# Patient Record
Sex: MALE | HISPANIC OR LATINO | Employment: UNEMPLOYED | URBAN - METROPOLITAN AREA
[De-identification: names, ages, dates, MRNs, and addresses within clinical notes are randomized per-mention and may not be internally consistent; named-entity substitution may affect disease eponyms.]

---

## 2017-05-19 ENCOUNTER — OFFICE VISIT (OUTPATIENT)
Dept: CARDIOLOGY | Facility: MEDICAL CENTER | Age: 82
End: 2017-05-19
Payer: COMMERCIAL

## 2017-05-19 VITALS
SYSTOLIC BLOOD PRESSURE: 140 MMHG | WEIGHT: 144 LBS | HEART RATE: 76 BPM | DIASTOLIC BLOOD PRESSURE: 66 MMHG | OXYGEN SATURATION: 96 %

## 2017-05-19 DIAGNOSIS — R07.89 ATYPICAL CHEST PAIN: ICD-10-CM

## 2017-05-19 DIAGNOSIS — I10 ESSENTIAL HYPERTENSION: ICD-10-CM

## 2017-05-19 DIAGNOSIS — I51.9 HEART DISEASE: ICD-10-CM

## 2017-05-19 LAB — EKG IMPRESSION: NORMAL

## 2017-05-19 PROCEDURE — 93000 ELECTROCARDIOGRAM COMPLETE: CPT | Performed by: INTERNAL MEDICINE

## 2017-05-19 PROCEDURE — 99244 OFF/OP CNSLTJ NEW/EST MOD 40: CPT | Performed by: INTERNAL MEDICINE

## 2017-05-19 RX ORDER — CIPROFLOXACIN 500 MG/1
500 TABLET, FILM COATED ORAL 2 TIMES DAILY
COMMUNITY

## 2017-05-19 RX ORDER — RISPERIDONE 2 MG/1
2 TABLET ORAL 2 TIMES DAILY
COMMUNITY

## 2017-05-19 RX ORDER — ISOSORBIDE DINITRATE 5 MG/1
5 TABLET ORAL 3 TIMES DAILY
COMMUNITY

## 2017-05-19 RX ORDER — AMLODIPINE BESYLATE 5 MG/1
5 TABLET ORAL DAILY
COMMUNITY

## 2017-05-19 RX ORDER — DIGOXIN 250 MCG
250 TABLET ORAL DAILY
COMMUNITY

## 2017-05-19 RX ORDER — CLONAZEPAM 2 MG/1
1 TABLET ORAL 2 TIMES DAILY
COMMUNITY

## 2017-05-19 NOTE — Clinical Note
[facility]   [address], [address]  [place] [address]  Phone: [phone]  Fax: [phone]              Shawn Garcia  1/4/1933    Encounter Date: 5/19/2017    Lorena Ayala M.D.          PROGRESS NOTE:  Subjective:   Shawn Garcia is a 84 y.o. male who presents today for cardiac care and evaluation of atypical chest pain. His chest pain has been fleeting and sensation. Lasting for seconds at a time. Patient was in the hospital recently and was told to monitor had a minor stroke. Patient lives 6 months in [place] and 6 months in [place]. Patient is not very communicative. He might have some baseline dementia. He is wheelchair-bound most of the time. He does ambulate but very little short distance.    I have reviewed patient's ECG, which shows normal sinus rhythm, normal [place], QT intervals. No evidence of acute coronary syndrome.    History reviewed. No pertinent past medical history.  History reviewed. No pertinent past surgical history.  History reviewed. No pertinent family history.  History   Smoking status   • Never Smoker    Smokeless tobacco   • Never Used     No Known Allergies  Outpatient Encounter Prescriptions as of 5/19/2017   Medication Sig Dispense Refill   • metformin (GLUCOPHAGE) 850 MG Tab Take 850 mg by mouth 2 times a day, with meals.     • digoxin (LANOXIN) 250 MCG Tab Take 250 mcg by mouth every day.     • aspirin 81 MG tablet Take 81 mg by mouth every day.     • isosorbide dinitrate (ISORDIL) 5 MG Tab Take 5 mg by mouth 3 times a day.     • ciprofloxacin (CIPRO) 500 MG Tab Take 500 mg by mouth 2 times a day.     • amlodipine (NORVASC) 5 MG Tab Take 5 mg by mouth every day.     • Biperiden HCl (AKINETON PO) Take  by mouth.     • risperidone (RISPERDAL) 2 MG Tab Take 2 mg by mouth 2 times a day.     • clonazepam (KLONOPIN) 2 MG tablet Take 1 mg by mouth 2 times a day.       No facility-administered encounter  medications on file as of 5/19/2017.     Review of Systems   Unable to perform ROS: dementia   Cardiovascular: Positive for chest pain.        Objective:   /66 mmHg  Pulse 76  Wt 65.318 kg (144 lb)  SpO2 96%    Physical Exam   Constitutional: He is oriented to person, place, and time. He appears well-developed and well-nourished.   HENT:   Head: Normocephalic and atraumatic.   Eyes: EOM are normal.   Neck: Normal range of motion. No JVD present.   Cardiovascular: Normal rate, regular rhythm, normal heart sounds and intact distal pulses.  Exam reveals no gallop and no friction rub.    No murmur heard.  Bilateral femoral pulses are 2+, bilateral dorsalis pedis pulses are 2+, bilateral posterior tibialis pulses are 2+.   Pulmonary/Chest: No respiratory distress. He has no wheezes. He has no rales. He exhibits no tenderness.   Abdominal: Soft. Bowel sounds are normal. There is no tenderness. There is no rebound and no guarding.   The is no presence of abdominal bruits   Musculoskeletal: Normal range of motion.   Neurological: He is alert and oriented to person, place, and time.   Skin: Skin is warm and dry.   Psychiatric: He has a normal mood and affect.   Nursing note and vitals reviewed.      Assessment:     1. Heart disease  EKG    ECHOCARDIOGRAM COMP W/O CONT   2. Atypical chest pain  ECHOCARDIOGRAM COMP W/O CONT   3. Essential hypertension  ECHOCARDIOGRAM COMP W/O CONT       Medical Decision Making:  Today's Assessment / Status / Plan:     Overall, I am not certain that patient is a good candidate for any further invasive cardiac workup or intervention.    Therefore, at this time conservative management is the best option for him. I will at least obtain a transthoracic echocardiogram to assess cardiac function and valvular functions.    In the meantime we will obtain records from [place]. No change in medical therapy today.      Sawyer Musa M.D.  [address] [facility] [facility]  [address]  [facility] [address]  VIA  Facsimile: [phone]

## 2017-05-19 NOTE — MR AVS SNAPSHOT
Yaron Cano Ahsan   2017 10:15 AM   Office Visit   MRN: [id number]    Department:  [facility]   Dept Phone:  [phone]    Description:  Male : 1933   Provider:  Lorena Ayala M.D.           Reason for Visit     New Patient           Allergies as of 2017     No Known Allergies      You were diagnosed with     Heart disease   [496551]       Atypical chest pain   [112287]       Essential hypertension   [3853365]         Vital Signs     Blood Pressure Pulse Weight Oxygen Saturation Smoking Status       140/66 mmHg 76 65.318 kg (144 lb) 96% Never Smoker        Basic Information     Date Of Birth Sex Race Ethnicity Preferred Language    1933 Male Unable to Obtain  Origin (Slovak,Eritrean,Iraqi,Uzbek, etc) Slovak      Your appointments     2017  2:15 PM   ECHO with ECHO [facility], [facility] EXAM 10   ECHOCARDIOLOGY [facility] ([facility])    [address] [facility]  [place] [address]   [phone]           No prep            2017  3:30 PM   FOLLOW UP with Lorena Ayala M.D.   [facility] (--)    [address], [address]  [place] [address] [phone]              Health Maintenance     Patient has no pending health maintenance at this time      Results       Current Immunizations     No immunizations on file.      Below and/or attached are the medications your provider expects you to take. Review all of your home medications and newly ordered medications with your provider and/or pharmacist. Follow medication instructions as directed by your provider and/or pharmacist. Please keep your medication list with you and share with your provider. Update the information when medications are discontinued, doses are changed, or new medications (including over-the-counter products) are added; and carry medication information at all times in the event of emergency situations     Allergies:  No Known Allergies          Medications   Valid as of: May 19, 2017 - 11:02 AM    Generic Name Brand Name Tablet Size Instructions for use    AmLODIPine Besylate (Tab) NORVASC 5 MG Take 5 mg by mouth every day.        Aspirin (Tab) aspirin 81 MG Take 81 mg by mouth every day.        Biperiden HCl   Take  by mouth.        Ciprofloxacin HCl (Tab) CIPRO 500 MG Take 500 mg by mouth 2 times a day.        ClonazePAM (Tab) KLONOPIN 2 MG Take 1 mg by mouth 2 times a day.        Digoxin (Tab) LANOXIN 250 MCG Take 250 mcg by mouth every day.        Isosorbide Dinitrate (Tab) ISORDIL 5 MG Take 5 mg by mouth 3 times a day.        MetFORMIN HCl (Tab) GLUCOPHAGE 850 MG Take 850 mg by mouth 2 times a day, with meals.        RisperiDONE (Tab) RISPERDAL 2 MG Take 2 mg by mouth 2 times a day.        .                 Medicines prescribed today were sent to:     [facility][id number] - [place], [place] - [address] AT [address] [place] [address]    Phone: [phone] Fax: [phone]    Open 24 Hours?: No      Medication refill instructions:       If your prescription bottle indicates you have medication refills left, it is not necessary to call your provider’s office. Please contact your pharmacy and they will refill your medication.    If your prescription bottle indicates you do not have any refills left, you may request refills at any time through one of the following ways: The online [employer] system (except Urgent Care), by calling your provider’s office, or by asking your pharmacy to contact your provider’s office with a refill request. Medication refills are processed only during regular business hours and may not be available until the next business day. Your provider may request additional information or to have a follow-up visit with you prior to refilling your medication.   *Please Note: Medication refills are assigned a new Rx number when refilled electronically. Your pharmacy may indicate that no refills were authorized even  though a new prescription for the same medication is available at the pharmacy. Please request the medicine by name with the pharmacy before contacting your provider for a refill.        Your To Do List     Future Labs/Procedures Complete By Expires    ECHOCARDIOGRAM COMP W/O CONT  As directed 5/20/2018         Alchip Access Code: [email or web address]  Expires: 6/18/2017 11:02 AM    Your email address is not on file at [facility].  Email Addresses are required for you to sign up for [employer], please contact [phone] to verify your personal information and to provide your email address prior to attempting to register for [employer].    Shawn Garcia  [address]  [place] [address]    Alchip  A secure, online tool to manage your health information     [facility]’s Alchip® is a secure, online tool that connects you to your personalized health information from the privacy of your home -- day or night - making it very easy for you to manage your healthcare. Once the activation process is completed, you can even access your medical information using the Alchip kelley, which is available for free in the Apple Kelley store or Google Play store.     To learn more about Alchip, visit [email or web address]    There are two levels of access available (as shown below):   My Chart Features  [facility]  Specialists [facility]  Urgent  Care [facility]   Email your healthcare team securely and privately 24/7 X X X    Manage appointments: schedule your next appointment; view details of past/upcoming appointments X      Request prescription refills. X      View recent personal medical records, including lab and immunizations X X X X   View health record, including health history, allergies, medications X X X X   Read reports about your outpatient visits, procedures, consult and ER notes X X X X   See your discharge summary, which is a recap of your hospital and/or ER  visit that includes your diagnosis, lab results, and care plan X X  X     How to register for [employer]:  Once your e-mail address has been verified, follow the following steps to sign up for [employer].     1. Go to  [email or web address]  2. Click on the Sign Up Now box, which takes you to the New Member Sign Up page. You will need to provide the following information:  a. Enter your [employer] Access Code exactly as it appears at the top of this page. (You will not need to use this code after you’ve completed the sign-up process. If you do not sign up before the expiration date, you must request a new code.)   b. Enter your date of birth.   c. Enter your home email address.   d. Click Submit, and follow the next screen’s instructions.  3. Create a [employer] ID. This will be your [employer] login ID and cannot be changed, so think of one that is secure and easy to remember.  4. Create a [employer] password. You can change your password at any time.  5. Enter your Password Reset Question and Answer. This can be used at a later time if you forget your password.   6. Enter your e-mail address. This allows you to receive e-mail notifications when new information is available in [employer].  7. Click Sign Up. You can now view your health information.    For assistance activating your [employer] account, call [phone]

## 2017-05-22 NOTE — PROGRESS NOTES
Subjective:   Shawn Garcia is a 84 y.o. male who presents today for cardiac care and evaluation of atypical chest pain. His chest pain has been fleeting and sensation. Lasting for seconds at a time. Patient was in the hospital recently and was told to monitor had a minor stroke. Patient lives 6 months in [place] and 6 months in [place]. Patient is not very communicative. He might have some baseline dementia. He is wheelchair-bound most of the time. He does ambulate but very little short distance.    I have reviewed patient's ECG, which shows normal sinus rhythm, normal [place], QT intervals. No evidence of acute coronary syndrome.    History reviewed. No pertinent past medical history.  History reviewed. No pertinent past surgical history.  History reviewed. No pertinent family history.  History   Smoking status   • Never Smoker    Smokeless tobacco   • Never Used     No Known Allergies  Outpatient Encounter Prescriptions as of 5/19/2017   Medication Sig Dispense Refill   • metformin (GLUCOPHAGE) 850 MG Tab Take 850 mg by mouth 2 times a day, with meals.     • digoxin (LANOXIN) 250 MCG Tab Take 250 mcg by mouth every day.     • aspirin 81 MG tablet Take 81 mg by mouth every day.     • isosorbide dinitrate (ISORDIL) 5 MG Tab Take 5 mg by mouth 3 times a day.     • ciprofloxacin (CIPRO) 500 MG Tab Take 500 mg by mouth 2 times a day.     • amlodipine (NORVASC) 5 MG Tab Take 5 mg by mouth every day.     • Biperiden HCl (AKINETON PO) Take  by mouth.     • risperidone (RISPERDAL) 2 MG Tab Take 2 mg by mouth 2 times a day.     • clonazepam (KLONOPIN) 2 MG tablet Take 1 mg by mouth 2 times a day.       No facility-administered encounter medications on file as of 5/19/2017.     Review of Systems   Unable to perform ROS: dementia   Cardiovascular: Positive for chest pain.        Objective:   /66 mmHg  Pulse 76  Wt 65.318 kg (144 lb)  SpO2 96%    Physical Exam   Constitutional: He is oriented to person, place,  and time. He appears well-developed and well-nourished.   HENT:   Head: Normocephalic and atraumatic.   Eyes: EOM are normal.   Neck: Normal range of motion. No JVD present.   Cardiovascular: Normal rate, regular rhythm, normal heart sounds and intact distal pulses.  Exam reveals no gallop and no friction rub.    No murmur heard.  Bilateral femoral pulses are 2+, bilateral dorsalis pedis pulses are 2+, bilateral posterior tibialis pulses are 2+.   Pulmonary/Chest: No respiratory distress. He has no wheezes. He has no rales. He exhibits no tenderness.   Abdominal: Soft. Bowel sounds are normal. There is no tenderness. There is no rebound and no guarding.   The is no presence of abdominal bruits   Musculoskeletal: Normal range of motion.   Neurological: He is alert and oriented to person, place, and time.   Skin: Skin is warm and dry.   Psychiatric: He has a normal mood and affect.   Nursing note and vitals reviewed.      Assessment:     1. Heart disease  EKG    ECHOCARDIOGRAM COMP W/O CONT   2. Atypical chest pain  ECHOCARDIOGRAM COMP W/O CONT   3. Essential hypertension  ECHOCARDIOGRAM COMP W/O CONT       Medical Decision Making:  Today's Assessment / Status / Plan:     Overall, I am not certain that patient is a good candidate for any further invasive cardiac workup or intervention.    Therefore, at this time conservative management is the best option for him. I will at least obtain a transthoracic echocardiogram to assess cardiac function and valvular functions.    In the meantime we will obtain records from [facility]. No change in medical therapy today.

## 2017-06-19 ENCOUNTER — HOSPITAL ENCOUNTER (OUTPATIENT)
Dept: CARDIOLOGY | Facility: MEDICAL CENTER | Age: 82
End: 2017-06-19
Attending: INTERNAL MEDICINE
Payer: COMMERCIAL

## 2017-06-19 DIAGNOSIS — I51.9 HEART DISEASE: ICD-10-CM

## 2017-06-19 DIAGNOSIS — R07.89 ATYPICAL CHEST PAIN: ICD-10-CM

## 2017-06-19 DIAGNOSIS — I10 ESSENTIAL HYPERTENSION: ICD-10-CM

## 2017-06-19 LAB
LV EJECT FRACT  99904: 65
LV EJECT FRACT MOD 2C 99903: 68.37
LV EJECT FRACT MOD 4C 99902: 75.35
LV EJECT FRACT MOD BP 99901: 68.51

## 2017-06-19 PROCEDURE — 93306 TTE W/DOPPLER COMPLETE: CPT | Mod: 26 | Performed by: INTERNAL MEDICINE

## 2017-06-19 PROCEDURE — 93306 TTE W/DOPPLER COMPLETE: CPT

## 2017-06-20 NOTE — PROGRESS NOTES
Quick Note:    Dear Ginny,    Can you please let Shawn Garcia know that result is ok and I will see patient as scheduled?    Thanks Pavel Gross.    ______

## 2017-06-21 ENCOUNTER — TELEPHONE (OUTPATIENT)
Dept: CARDIOLOGY | Facility: MEDICAL CENTER | Age: 82
End: 2017-06-21

## 2017-06-21 NOTE — TELEPHONE ENCOUNTER
Message  Received: Yesterday       SHYANN Rodriguez R.N.                   Dear Ginny,     Can you please let Shawn Garcia know that result is ok and I will see patient as scheduled?     Thanks Pavel Gross.       Attempted call x 2  No answer and Unable to leave message as voicemail is full.  Will send letter.

## 2017-06-27 ENCOUNTER — TELEPHONE (OUTPATIENT)
Dept: CARDIOLOGY | Facility: MEDICAL CENTER | Age: 82
End: 2017-06-27

## 2017-06-29 ENCOUNTER — TELEPHONE (OUTPATIENT)
Dept: CARDIOLOGY | Facility: MEDICAL CENTER | Age: 82
End: 2017-06-29

## 2017-06-29 NOTE — TELEPHONE ENCOUNTER
----- Message from Lorena Ayala M.D. sent at 6/20/2017  8:42 AM PDT -----  Dear Ginny,    Can you please let Shawn Garcia know that result is ok and I will see patient as scheduled?    Thanks Pavel Gross.

## 2017-06-29 NOTE — TELEPHONE ENCOUNTER
Left a message with a relative regarding Dr. Ayala's echocardiogram interpretation.    MARTÍNEZ RN

## 2017-06-30 ENCOUNTER — OFFICE VISIT (OUTPATIENT)
Dept: CARDIOLOGY | Facility: MEDICAL CENTER | Age: 82
End: 2017-06-30
Payer: COMMERCIAL

## 2017-06-30 VITALS
HEART RATE: 70 BPM | BODY MASS INDEX: 21.16 KG/M2 | HEIGHT: 62 IN | DIASTOLIC BLOOD PRESSURE: 54 MMHG | WEIGHT: 115 LBS | SYSTOLIC BLOOD PRESSURE: 90 MMHG

## 2017-06-30 DIAGNOSIS — Z29.9 PREVENTIVE MEASURE: ICD-10-CM

## 2017-06-30 PROCEDURE — 99213 OFFICE O/P EST LOW 20 MIN: CPT | Performed by: INTERNAL MEDICINE

## 2017-06-30 ASSESSMENT — ENCOUNTER SYMPTOMS
COUGH: 0
LOSS OF CONSCIOUSNESS: 0
CHILLS: 0
MEMORY LOSS: 1
BLOOD IN STOOL: 0
FEVER: 0
ORTHOPNEA: 0
HALLUCINATIONS: 0
DIZZINESS: 0
SENSORY CHANGE: 0
PALPITATIONS: 0
SHORTNESS OF BREATH: 0
HEADACHES: 0
CLAUDICATION: 0
NAUSEA: 0
BLURRED VISION: 0
BRUISES/BLEEDS EASILY: 0
FALLS: 0
SPEECH CHANGE: 0
ABDOMINAL PAIN: 0
PND: 0
EYE PAIN: 0
WEIGHT LOSS: 0
EYE DISCHARGE: 0
DEPRESSION: 0
VOMITING: 0
DOUBLE VISION: 0
MYALGIAS: 0

## 2017-06-30 NOTE — Clinical Note
[facility]   [address], [address]  [place] [address]  Phone: [phone]  Fax: [phone]              Shawn Garcia  1/4/1933    Encounter Date: 6/30/2017    Lorena Ayala M.D.          PROGRESS NOTE:  Subjective:   Shawn Garcia is a 84 y.o. male who presents today for cardiac care and evaluation of atypical chest pain.     At the last visit, I obtain a transthoracic echocardiogram which shows normal left ventricular systolic function and valvular functions.  No clinical change since last visit.  History reviewed. No pertinent past medical history.  History reviewed. No pertinent past surgical history.  Family History   Problem Relation Age of Onset   • Family history unknown: Yes     History   Smoking status   • Never Smoker    Smokeless tobacco   • Never Used     No Known Allergies  Outpatient Encounter Prescriptions as of 6/30/2017   Medication Sig Dispense Refill   • metformin (GLUCOPHAGE) 850 MG Tab Take 850 mg by mouth 2 times a day, with meals.     • digoxin (LANOXIN) 250 MCG Tab Take 250 mcg by mouth every day.     • aspirin 81 MG tablet Take 81 mg by mouth every day.     • isosorbide dinitrate (ISORDIL) 5 MG Tab Take 5 mg by mouth 3 times a day.     • ciprofloxacin (CIPRO) 500 MG Tab Take 500 mg by mouth 2 times a day.     • amlodipine (NORVASC) 5 MG Tab Take 5 mg by mouth every day.     • Biperiden HCl (AKINETON PO) Take  by mouth.     • risperidone (RISPERDAL) 2 MG Tab Take 2 mg by mouth 2 times a day.     • clonazepam (KLONOPIN) 2 MG tablet Take 1 mg by mouth 2 times a day.       No facility-administered encounter medications on file as of 6/30/2017.     Review of Systems   Constitutional: Negative for fever, chills, weight loss and malaise/fatigue.   HENT: Positive for hearing loss. Negative for ear discharge, ear pain and nosebleeds.    Eyes: Negative for blurred vision, double vision, pain and discharge.    "  Respiratory: Negative for cough and shortness of breath.    Cardiovascular: Negative for chest pain, palpitations, orthopnea, claudication, leg swelling and PND.   Gastrointestinal: Negative for nausea, vomiting, abdominal pain, blood in stool and melena.   Genitourinary: Negative for dysuria and hematuria.   Musculoskeletal: Negative for myalgias, joint pain and falls.   Skin: Negative for itching and rash.   Neurological: Negative for dizziness, sensory change, speech change, loss of consciousness and headaches.   Endo/Heme/Allergies: Negative for environmental allergies. Does not bruise/bleed easily.   Psychiatric/Behavioral: Positive for memory loss. Negative for depression, suicidal ideas and hallucinations.        Objective:   BP 90/54 mmHg  Pulse 70  Ht 1.575 m (5' 2.01\")  Wt 52.164 kg (115 lb)  BMI 21.03 kg/m2    Physical Exam   Constitutional: He is oriented to person, place, and time. No distress.   HENT:   Head: Normocephalic and atraumatic.   Eyes: EOM are normal.   Neck: Normal range of motion. No JVD present.   Cardiovascular: Normal rate, regular rhythm, normal heart sounds and intact distal pulses.  Exam reveals no gallop and no friction rub.    No murmur heard.  Bilateral femoral pulses are 2+, bilateral dorsalis pedis pulses are 2+, bilateral posterior tibialis pulses are 2+.   Pulmonary/Chest: No respiratory distress. He has no wheezes. He has no rales. He exhibits no tenderness.   Abdominal: Soft. Bowel sounds are normal. There is no tenderness. There is no rebound and no guarding.   The is no presence of abdominal bruits   Musculoskeletal: Normal range of motion. He exhibits no edema or tenderness.   Neurological: He is alert and oriented to person, place, and time.   Skin: Skin is warm and dry.   Psychiatric: He has a normal mood and affect.   Nursing note and vitals reviewed.      Assessment:     1. Preventive measure  COMP METABOLIC PANEL    LIPID PANEL       Medical Decision Making:  " Today's Assessment / Status / Plan:     At this time, I would like to employ conservative management for him.  No further change in cardiac medications.  No further change in clinical treatments.  No further cardiac testing at this time.      Sawyer Musa M.D.  [address]  [facility] [address]  VIA Facsimile: [phone]

## 2017-06-30 NOTE — MR AVS SNAPSHOT
"Shawn turner Jesus Abdirahmanluis Foreman   2017 3:30 PM   Office Visit   MRN: [id number]    Department:  [facility]   Dept Phone:  [phone]    Description:  Male : 1933   Provider:  Lorena Ayala M.D.           Reason for Visit     Follow-Up           Allergies as of 2017     No Known Allergies      You were diagnosed with     Preventive measure   [525294]         Vital Signs     Blood Pressure Pulse Height Weight Body Mass Index Smoking Status    90/54 mmHg 70 1.575 m (5' 2.01\") 52.164 kg (115 lb) 21.03 kg/m2 Never Smoker       Basic Information     Date Of Birth Sex Race Ethnicity Preferred Language    1933 Male Unable to Obtain  Origin ([place],Faroese,[place]ux[place]bour[place],Botswanan, etc) [place]      Health Maintenance        Date Due Completion Dates    IMM DTaP/Tdap/Td Vaccine (1 - Tdap) 1952 ---    COLONOSCOPY 1983 ---    IMM ZOSTER VACCINE 1993 ---    IMM PNEUMOCOCCAL 65+ (ADULT) LOW/MEDIUM RISK SERIES (1 of 2 - PCV13) 1998 ---            Current Immunizations     No immunizations on file.      Below and/or attached are the medications your provider expects you to take. Review all of your home medications and newly ordered medications with your provider and/or pharmacist. Follow medication instructions as directed by your provider and/or pharmacist. Please keep your medication list with you and share with your provider. Update the information when medications are discontinued, doses are changed, or new medications (including over-the-counter products) are added; and carry medication information at all times in the event of emergency situations     Allergies:  No Known Allergies          Medications  Valid as of: 2017 -  4:07 PM    Generic Name Brand Name Tablet Size Instructions for use    AmLODIPine Besylate (Tab) NORVASC 5 MG Take 5 mg by mouth every day.        Aspirin (Tab) aspirin 81 MG Take 81 mg by mouth every day.        Biperiden HCl   Take  by " mouth.        Ciprofloxacin HCl (Tab) CIPRO 500 MG Take 500 mg by mouth 2 times a day.        ClonazePAM (Tab) KLONOPIN 2 MG Take 1 mg by mouth 2 times a day.        Digoxin (Tab) LANOXIN 250 MCG Take 250 mcg by mouth every day.        Isosorbide Dinitrate (Tab) ISORDIL 5 MG Take 5 mg by mouth 3 times a day.        MetFORMIN HCl (Tab) GLUCOPHAGE 850 MG Take 850 mg by mouth 2 times a day, with meals.        RisperiDONE (Tab) RISPERDAL 2 MG Take 2 mg by mouth 2 times a day.        .                 Medicines prescribed today were sent to:     [facility][id number] - [place], [place] - [address] AT [address] [place] [address]    Phone: [phone] Fax: [phone]    Open 24 Hours?: No      Medication refill instructions:       If your prescription bottle indicates you have medication refills left, it is not necessary to call your provider’s office. Please contact your pharmacy and they will refill your medication.    If your prescription bottle indicates you do not have any refills left, you may request refills at any time through one of the following ways: The online [employer] system (except Urgent Care), by calling your provider’s office, or by asking your pharmacy to contact your provider’s office with a refill request. Medication refills are processed only during regular business hours and may not be available until the next business day. Your provider may request additional information or to have a follow-up visit with you prior to refilling your medication.   *Please Note: Medication refills are assigned a new Rx number when refilled electronically. Your pharmacy may indicate that no refills were authorized even though a new prescription for the same medication is available at the pharmacy. Please request the medicine by name with the pharmacy before contacting your provider for a refill.        Your To Do List     Future Labs/Procedures Complete By Expires    COMP METABOLIC  PANEL  As directed 7/1/2018         [employer] Access Code: [email or web address]  Expires: 7/29/2017  4:10 AM    Your email address is not on file at [facility] [employer].  Email Addresses are required for you to sign up for [employer], please contact [phone] to verify your personal information and to provide your email address prior to attempting to register for [employer].    Shawn Garcia  [address]  [place] [address]    [employer]  A secure, online tool to manage your health information     [employer]’s [employer]® is a secure, online tool that connects you to your personalized health information from the privacy of your home -- day or night - making it very easy for you to manage your healthcare. Once the activation process is completed, you can even access your medical information using the [employer] kelley, which is available for free in the [employer] store or Google Play store.     To learn more about [employer], visit [email or web address]    There are two levels of access available (as shown below):   My Chart Features  [facility] Primary Care Doctor [facility]  Specialists [facility]  Urgent  Care [facility] Primary Care Doctor   Email your healthcare team securely and privately 24/7 X X X    Manage appointments: schedule your next appointment; view details of past/upcoming appointments X      Request prescription refills. X      View recent personal medical records, including lab and immunizations X X X X   View health record, including health history, allergies, medications X X X X   Read reports about your outpatient visits, procedures, consult and ER notes X X X X   See your discharge summary, which is a recap of your hospital and/or ER visit that includes your diagnosis, lab results, and care plan X X  X     How to register for [employer]:  Once your e-mail address has been verified, follow the following steps to sign up for [email or web address].     1. Go to  [email or web address]  2. Click on the Sign Up Now box, which  takes you to the New Member Sign Up page. You will need to provide the following information:  a. Enter your [employer] Access Code exactly as it appears at the top of this page. (You will not need to use this code after you’ve completed the sign-up process. If you do not sign up before the expiration date, you must request a new code.)   b. Enter your date of birth.   c. Enter your home email address.   d. Click Submit, and follow the next screen’s instructions.  3. Create a [employer] ID. This will be your [employer] login ID and cannot be changed, so think of one that is secure and easy to remember.  4. Create a [employer] password. You can change your password at any time.  5. Enter your Password Reset Question and Answer. This can be used at a later time if you forget your password.   6. Enter your e-mail address. This allows you to receive e-mail notifications when new information is available in [employer].  7. Click Sign Up. You can now view your health information.    For assistance activating your [employer] account, call [phone]

## 2017-06-30 NOTE — PROGRESS NOTES
Subjective:   Shawn Garcia is a 84 y.o. male who presents today for cardiac care and evaluation of atypical chest pain.     At the last visit, I obtain a transthoracic echocardiogram which shows normal left ventricular systolic function and valvular functions.  No clinical change since last visit.  History reviewed. No pertinent past medical history.  History reviewed. No pertinent past surgical history.  Family History   Problem Relation Age of Onset   • Family history unknown: Yes     History   Smoking status   • Never Smoker    Smokeless tobacco   • Never Used     No Known Allergies  Outpatient Encounter Prescriptions as of 6/30/2017   Medication Sig Dispense Refill   • metformin (GLUCOPHAGE) 850 MG Tab Take 850 mg by mouth 2 times a day, with meals.     • digoxin (LANOXIN) 250 MCG Tab Take 250 mcg by mouth every day.     • aspirin 81 MG tablet Take 81 mg by mouth every day.     • isosorbide dinitrate (ISORDIL) 5 MG Tab Take 5 mg by mouth 3 times a day.     • ciprofloxacin (CIPRO) 500 MG Tab Take 500 mg by mouth 2 times a day.     • amlodipine (NORVASC) 5 MG Tab Take 5 mg by mouth every day.     • Biperiden HCl (AKINETON PO) Take  by mouth.     • risperidone (RISPERDAL) 2 MG Tab Take 2 mg by mouth 2 times a day.     • clonazepam (KLONOPIN) 2 MG tablet Take 1 mg by mouth 2 times a day.       No facility-administered encounter medications on file as of 6/30/2017.     Review of Systems   Constitutional: Negative for fever, chills, weight loss and malaise/fatigue.   HENT: Positive for hearing loss. Negative for ear discharge, ear pain and nosebleeds.    Eyes: Negative for blurred vision, double vision, pain and discharge.   Respiratory: Negative for cough and shortness of breath.    Cardiovascular: Negative for chest pain, palpitations, orthopnea, claudication, leg swelling and PND.   Gastrointestinal: Negative for nausea, vomiting, abdominal pain, blood in stool and melena.   Genitourinary:  "Negative for dysuria and hematuria.   Musculoskeletal: Negative for myalgias, joint pain and falls.   Skin: Negative for itching and rash.   Neurological: Negative for dizziness, sensory change, speech change, loss of consciousness and headaches.   Endo/Heme/Allergies: Negative for environmental allergies. Does not bruise/bleed easily.   Psychiatric/Behavioral: Positive for memory loss. Negative for depression, suicidal ideas and hallucinations.        Objective:   BP 90/54 mmHg  Pulse 70  Ht 1.575 m (5' 2.01\")  Wt 52.164 kg (115 lb)  BMI 21.03 kg/m2    Physical Exam   Constitutional: He is oriented to person, place, and time. No distress.   HENT:   Head: Normocephalic and atraumatic.   Eyes: EOM are normal.   Neck: Normal range of motion. No JVD present.   Cardiovascular: Normal rate, regular rhythm, normal heart sounds and intact distal pulses.  Exam reveals no gallop and no friction rub.    No murmur heard.  Bilateral femoral pulses are 2+, bilateral dorsalis pedis pulses are 2+, bilateral posterior tibialis pulses are 2+.   Pulmonary/Chest: No respiratory distress. He has no wheezes. He has no rales. He exhibits no tenderness.   Abdominal: Soft. Bowel sounds are normal. There is no tenderness. There is no rebound and no guarding.   The is no presence of abdominal bruits   Musculoskeletal: Normal range of motion. He exhibits no edema or tenderness.   Neurological: He is alert and oriented to person, place, and time.   Skin: Skin is warm and dry.   Psychiatric: He has a normal mood and affect.   Nursing note and vitals reviewed.      Assessment:     1. Preventive measure  COMP METABOLIC PANEL    LIPID PANEL       Medical Decision Making:  Today's Assessment / Status / Plan:     At this time, I would like to employ conservative management for him.  No further change in cardiac medications.  No further change in clinical treatments.  No further cardiac testing at this time.  "

## 2017-08-02 ENCOUNTER — TELEPHONE (OUTPATIENT)
Dept: CARDIOLOGY | Facility: MEDICAL CENTER | Age: 82
End: 2017-08-02

## 2017-08-02 NOTE — TELEPHONE ENCOUNTER
Pt daughter wants to know if father is okay to travel to [place] in Sept  Received: Today       Leonidas Antony R.N.       Phone Number: [phone]                     TT/Ginny     Pt daughter, Alise called to inform father will be traveling to [place] in September. She is concerned and wants to know with the [place] altitude if it's okay for him to fly? Please call to advise at [phone] or cell # [phone].     (daughter Polish speaking only)       Returned patient call. First number has no availability in mailbox. Second ph# called.  requested. Language Line utilized.   Encouraged Alise to have her Dad see PCP before flight/purchasing ticket if she has overall concerns as there are other health issues in an 85 yo that may effect travel.  She has concerns because his last flight in April he became symptomatic and required oxygen and felt lightheaded. She states they took him to a hospital in [place] at that time but she is not aware of any medical findings and we do not have those medical records. Encouraged again and re-emphasized a visit to pts PCP for an overall well check is recommended.

## 2017-08-14 DIAGNOSIS — Z29.9 PREVENTIVE MEASURE: ICD-10-CM

## 2017-08-24 ENCOUNTER — TELEPHONE (OUTPATIENT)
Dept: CARDIOLOGY | Facility: MEDICAL CENTER | Age: 82
End: 2017-08-24

## 2017-08-24 NOTE — TELEPHONE ENCOUNTER
Pt daughter calling for lab results   Received: Yesterday       Leonidas Antony R.N.       Phone Number: [phone]                     TT/Ginny     Pt daughter, Alise is calling for results of father's recent labs. She can be reached at [phone].       Returned call. Discussed abnormal lab results with Alise. Encouraged to make a FU appt with PCP or our offices to FU on labs. She states PCP received a copy also and she will make him an appt to be seen.

## 2021-01-15 DIAGNOSIS — Z23 NEED FOR VACCINATION: ICD-10-CM
